# Patient Record
Sex: MALE | Race: WHITE | NOT HISPANIC OR LATINO | Employment: UNEMPLOYED | URBAN - METROPOLITAN AREA
[De-identification: names, ages, dates, MRNs, and addresses within clinical notes are randomized per-mention and may not be internally consistent; named-entity substitution may affect disease eponyms.]

---

## 2019-04-06 ENCOUNTER — HOSPITAL ENCOUNTER (EMERGENCY)
Facility: HOSPITAL | Age: 54
Discharge: HOME | End: 2019-04-06
Attending: EMERGENCY MEDICINE

## 2019-04-06 VITALS
DIASTOLIC BLOOD PRESSURE: 78 MMHG | TEMPERATURE: 98 F | OXYGEN SATURATION: 97 % | RESPIRATION RATE: 14 BRPM | HEART RATE: 83 BPM | SYSTOLIC BLOOD PRESSURE: 119 MMHG

## 2019-04-06 DIAGNOSIS — R06.2 WHEEZES: ICD-10-CM

## 2019-04-06 DIAGNOSIS — F10.220 ALCOHOL INTOXICATION IN ACTIVE ALCOHOLIC WITHOUT COMPLICATION: Primary | ICD-10-CM

## 2019-04-06 DIAGNOSIS — Z72.0 TOBACCO USE: ICD-10-CM

## 2019-04-06 DIAGNOSIS — Z59.00 HOMELESS: ICD-10-CM

## 2019-04-06 PROCEDURE — 99284 EMERGENCY DEPT VISIT MOD MDM: CPT | Mod: ,,, | Performed by: PHYSICIAN ASSISTANT

## 2019-04-06 PROCEDURE — 99284 PR EMERGENCY DEPT VISIT,LEVEL IV: ICD-10-PCS | Mod: ,,, | Performed by: PHYSICIAN ASSISTANT

## 2019-04-06 PROCEDURE — 99281 EMR DPT VST MAYX REQ PHY/QHP: CPT

## 2019-04-06 SDOH — SOCIAL DETERMINANTS OF HEALTH (SDOH): HOMELESSNESS UNSPECIFIED: Z59.00

## 2019-04-06 NOTE — ED NOTES
"Pt identifiers checked and accurate with Philipp Owens     Pt reports to ED via EMS after being found sleeping on [place] this AM. Pt reports drinking 2 pints yesterday, pt states "I drink some days and some days I lay off, I self medicate with cigarettes and alcohol". Pt denies SI, HI, hallucinations, falls, headache, pain.     LOC: The patient is awake, alert and aware of environment with an appropriate affect, the patient is oriented x 3 and speaking appropriately.  APPEARANCE: Patient resting comfortably and in no acute distress, pt presents with poor hygiene, disheveled.   SKIN: The skin is warm and dry, color consistent with ethnicity, patient has normal skin turgor and moist mucus membranes, skin intact.   MUSCULOSKELETAL: Patient moving all extremities well, no obvious swelling or deformities noted. Pt ambulates without assistance, steady gait noted.   RESPIRATORY: Airway is open and patent; respirations are spontaneous, patient has a normal effort and rate, no accessory muscle use noted.   NEUROLOGIC: PERRL, 3 mm bilaterally, purposeful motor response noted, normal sensation in all extremities when touched with a finger.      "

## 2019-04-06 NOTE — ED PROVIDER NOTES
"Encounter Date: 4/6/2019       History     Chief Complaint   Patient presents with    Alcohol Intoxication     found lying on [facility]. sleeping on the street      The patient was brought to the ER by EMS. He reports that he "fell asleep in the wrong place". The paramedics state that they were called by city workers who found the patient sleeping on the sidewalk along [facility] this morning. The patient states that he is an alcoholic and homeless. He denies any pain or physical complaints presently. He states that he typically drinks a fifth of liqour daily and his last drink was around 3 am this morning. He states that he did not intend or want to come to the ER. When asked if he needs anything, his response was "another drink and a cigarette would be great". He states that he does not want any help with alcohol or substance abuse. He denies any thoughts of harming himself or others.          Review of patient's allergies indicates:  No Known Allergies  Past Medical History:   Diagnosis Date    Bipolar 1 disorder 1994     No past surgical history on file.  History reviewed. No pertinent family history.  Social History     Tobacco Use    Smoking status: Not on file   Substance Use Topics    Alcohol use: Not on file    Drug use: Not on file     Review of Systems   Constitutional: Negative for fever.   HENT: Negative for sore throat.    Eyes: Negative for visual disturbance.   Respiratory: Negative for cough, chest tightness and shortness of breath.    Cardiovascular: Negative for chest pain.   Gastrointestinal: Negative for abdominal pain, blood in stool, constipation, diarrhea, nausea and vomiting.   Endocrine: Negative for polydipsia and polyuria.   Genitourinary: Negative for decreased urine volume.   Musculoskeletal: Negative for back pain, gait problem and neck pain.   Skin: Negative for rash and wound.   Neurological: Negative for dizziness, tremors, seizures, syncope, facial asymmetry, speech " difficulty, weakness, light-headedness and headaches.   Psychiatric/Behavioral: Negative for agitation, behavioral problems and confusion.       Physical Exam     Initial Vitals [04/06/19 0724]   BP Pulse Resp Temp SpO2   119/78 83 14 98.2 °F (36.8 °C) 97 %      MAP       --         Physical Exam    Nursing note and vitals reviewed.  Constitutional: He appears well-developed and well-nourished. He is not diaphoretic.   Alert and ambulatory. Smiling and jovial. No apparent distress presently. Smells strongly of EtOH.     HENT:   Head: Normocephalic and atraumatic.   Mouth/Throat: Oropharynx is clear and moist.   Eyes: Conjunctivae are normal. No scleral icterus.   Neck: Neck supple.   Cardiovascular: Normal rate, regular rhythm and intact distal pulses.   Pulmonary/Chest: No respiratory distress.   Mild expiratory wheeze noted. Infrequent cough. No tachypnea. No hypoxia. Speaks in complete sentences. Playing the harmonica during interview.    Abdominal: Soft. He exhibits no distension. There is no tenderness. There is no rebound and no guarding.   Neurological: He is alert and oriented to person, place, and time. He has normal strength. No sensory deficit.   No facial droop. Normal gait.    Skin: Skin is warm and dry. No rash noted.   No jaundice.    Psychiatric: He has a normal mood and affect. His behavior is normal.   He denies any SI or HI. He denies any hallucinations. He admits to alcohol use. He denies drug use. He does not want treatment for alcoholism.          ED Course   Procedures  Labs Reviewed - No data to display       Imaging Results    None          Medical Decision Making:   Initial Assessment:   Pt brought to the ER by EMS after city workers [place] the [facility] Pt reports daily alcohol use and homelessness. He is alert and interactive presently, reports last drink around 3 am this morning. Asking to be discharged, denies any complaints.   Differential  Diagnosis:   Alcohol intoxication, Drug abuse, Homelessness, Infection, Dehydration, CVA, Psychiatric illness, etc   ED Management:  Pt denies any physical complaints. He has alcohol on board, but is not disoriented, or overtly inebriated. He is ambulating in the ER and playing his harmonica without any distress. He is urinating. He is requesting breakfast and to be discharged. He does have a minor cough and wheezes on exam. I discussed smoking cessation with him, but he indicates disinterest in smoking cessation. I offered to prescribe him an inhaler for the wheezes, but he states that he is not going to fill any prescriptions. I did provide him with resources for homeless and alcohol abuse programs locally. I advised him to return to the ER if worse in any way. I discussed the case in detail with the ER attending physician.      Additional MDM:   Smoking Cessation: The patient is a smoker. The patient was counseled on smoking cessation for: 3 minutes. The patient was counseled on tobacco related  health complications. Appropriate patient literature was given to the patient concerning tobacco cessation.                    Clinical Impression:       ICD-10-CM ICD-9-CM   1. Alcohol intoxication in active alcoholic without complication F10.220 303.00   2. Tobacco use Z72.0 305.1   3. Wheezes R06.2 786.07   4. Homeless Z59.0 V60.0         Disposition:   Disposition: Discharged  Condition: Stable                        Hal Lara PA-C  04/06/19 0757